# Patient Record
Sex: FEMALE | Race: WHITE | NOT HISPANIC OR LATINO | URBAN - METROPOLITAN AREA
[De-identification: names, ages, dates, MRNs, and addresses within clinical notes are randomized per-mention and may not be internally consistent; named-entity substitution may affect disease eponyms.]

---

## 2018-08-28 ENCOUNTER — IMPORTED ENCOUNTER (OUTPATIENT)
Dept: URBAN - METROPOLITAN AREA CLINIC 1 | Facility: CLINIC | Age: 58
End: 2018-08-28

## 2018-08-28 PROBLEM — H43.811: Noted: 2018-08-28

## 2018-08-28 PROCEDURE — 92002 INTRM OPH EXAM NEW PATIENT: CPT

## 2018-10-10 ENCOUNTER — IMPORTED ENCOUNTER (OUTPATIENT)
Dept: URBAN - METROPOLITAN AREA CLINIC 1 | Facility: CLINIC | Age: 58
End: 2018-10-10

## 2018-10-10 PROBLEM — H43.811: Noted: 2018-10-10

## 2018-10-10 PROBLEM — H25.813: Noted: 2018-10-10

## 2018-10-10 PROCEDURE — 92014 COMPRE OPH EXAM EST PT 1/>: CPT

## 2018-10-10 NOTE — PATIENT DISCUSSION
1.  PVD w/o Tear OD (No Pigmented Cells seen or found today) -- No Retinal Detachment and / or Tear seen or found on today's Exam. RD Precautions given. Patient was cautioned to call our office immediately if they experience a substantial change in their symptoms such as an increase in floaters persistent flashes loss of visual field (may appear as a shadow or a curtain) or decrease in visual acuity as these may indicate a retinal tear or detachment. 2.  Cataracts OU -- Observe for now without intervention. The patient was advised to contact us if any change or worsening of vision. 3.  RUL Nasal Papilloma -- Observe. Return for an appointment in 1 YR for a 30 OU with Dr. Chanda Almanza

## 2018-10-29 NOTE — PROCEDURE NOTE: SURGICAL
<p>Prior to commencing surgery patient identification, surgical procedure, site, and side were confirmed by Dr. Colt Guadalupe. Following topical proparacaine anesthesia, the patient was positioned at the YAG laser, a contact lens coupled to the cornea with methylcellulose and an axial posterior capsulotomy performed without complication using 3.2 Mj x 17. &nbsp; One drop of Alphagan was instilled and the patient returned to the holding area having tolerated the procedure well and without complication. </p><p>MRN [id number]</p>

## 2019-10-09 ENCOUNTER — IMPORTED ENCOUNTER (OUTPATIENT)
Dept: URBAN - METROPOLITAN AREA CLINIC 1 | Facility: CLINIC | Age: 59
End: 2019-10-09

## 2019-10-09 PROBLEM — D23.111: Noted: 2019-10-09

## 2019-10-09 PROBLEM — H43.811: Noted: 2019-10-09

## 2019-10-09 PROBLEM — H25.813: Noted: 2019-10-09

## 2019-10-09 PROBLEM — D23.112: Noted: 2019-10-09

## 2019-10-09 PROCEDURE — 92014 COMPRE OPH EXAM EST PT 1/>: CPT

## 2019-10-09 NOTE — PATIENT DISCUSSION
Papilloma RUL: apppears benign OD - The patient has a lesion of the right upper eyelid which appears benign. Measurements were taken and observation at regular intervals was recommended. The patient was asked to report  if there is any growth.

## 2019-10-09 NOTE — PATIENT DISCUSSION
1.  Cataract OU -- Observe for now without intervention. The patient was advised to contact us if any change or worsening of vision2. RUL Nasal Papilloma -- Observe. 3.  PVD w/o Tear OD -- RD precautions. Patient deferred Manifest Rx today. Return for an appointment in 1 year 27 with Dr. Pepito Candelaria.

## 2021-06-02 ENCOUNTER — IMPORTED ENCOUNTER (OUTPATIENT)
Dept: URBAN - METROPOLITAN AREA CLINIC 1 | Facility: CLINIC | Age: 61
End: 2021-06-02

## 2021-06-02 PROBLEM — H43.811: Noted: 2021-06-02

## 2021-06-02 PROBLEM — H25.813: Noted: 2021-06-02

## 2021-06-02 PROBLEM — D23.111: Noted: 2021-06-02

## 2021-06-02 PROCEDURE — 92014 COMPRE OPH EXAM EST PT 1/>: CPT

## 2021-06-02 NOTE — PATIENT DISCUSSION
1.  Cataract OU -- Mild progression OU. Non-surgical at this time continue to monitor for progression. No visual/glare complaints at today's visit. The patient was advised to contact us if any change or worsening of vision. 2. Papilloma RUL -- Stable. Appears benign. Observe for change. 3.  PVD w/o Tear OD -- Stable. RD Precautions. Patient deferred MRx at today's visit. Return for an appointment in 1 year 27 with Dr. Calvin Colmenares.

## 2021-06-02 NOTE — PATIENT DISCUSSION
Papilloma RUL -- Stable. Appears benign. Observe for change. 3.  PVD w/o Tear OD -- Stable. RD Precautions. Patient deferred MRx at today's visit. Return for an appointment in 1 year 27 with Dr. Hannah Singh.

## 2022-04-02 ASSESSMENT — VISUAL ACUITY
OS_SC: 20/20
OD_CC: J1+
OD_CC: J1
OS_CC: J1+
OD_CC: 20/30-2
OD_SC: 20/20
OD_SC: 20/20
OS_SC: 20/20
OD_GLARE: 20/50
OS_GLARE: 20/50
OD_SC: 20/20
OS_SC: 20/25-2
OS_CC: J1
OS_CC: 20/40

## 2022-04-02 ASSESSMENT — TONOMETRY
OS_IOP_MMHG: 15
OD_IOP_MMHG: 15
OD_IOP_MMHG: 15
OD_IOP_MMHG: 14
OS_IOP_MMHG: 14
OS_IOP_MMHG: 15
OD_IOP_MMHG: 15

## 2022-06-01 ENCOUNTER — COMPREHENSIVE EXAM (OUTPATIENT)
Dept: URBAN - METROPOLITAN AREA CLINIC 1 | Facility: CLINIC | Age: 62
End: 2022-06-01

## 2022-06-01 DIAGNOSIS — H43.811: ICD-10-CM

## 2022-06-01 DIAGNOSIS — D23.111: ICD-10-CM

## 2022-06-01 DIAGNOSIS — H25.813: ICD-10-CM

## 2022-06-01 PROCEDURE — 92014 COMPRE OPH EXAM EST PT 1/>: CPT

## 2022-06-01 PROCEDURE — 92015 DETERMINE REFRACTIVE STATE: CPT

## 2022-06-01 ASSESSMENT — VISUAL ACUITY
OS_CC: 20/20 -2
OD_CC: 20/20
OD_CC: J1
OS_CC: J1

## 2022-06-01 ASSESSMENT — TONOMETRY
OS_IOP_MMHG: 15
OD_IOP_MMHG: 15

## 2023-06-29 ENCOUNTER — COMPREHENSIVE EXAM (OUTPATIENT)
Dept: URBAN - METROPOLITAN AREA CLINIC 1 | Facility: CLINIC | Age: 63
End: 2023-06-29

## 2023-06-29 DIAGNOSIS — D23.111: ICD-10-CM

## 2023-06-29 DIAGNOSIS — H43.811: ICD-10-CM

## 2023-06-29 DIAGNOSIS — H25.813: ICD-10-CM

## 2023-06-29 PROCEDURE — 92015 DETERMINE REFRACTIVE STATE: CPT

## 2023-06-29 PROCEDURE — 92014 COMPRE OPH EXAM EST PT 1/>: CPT

## 2023-06-29 ASSESSMENT — VISUAL ACUITY
OD_CC: 20/20
OS_CC: J1
OS_CC: 20/20
OD_CC: J1

## 2023-06-29 ASSESSMENT — TONOMETRY
OS_IOP_MMHG: 14
OD_IOP_MMHG: 15

## 2024-07-01 ENCOUNTER — COMPREHENSIVE EXAM (OUTPATIENT)
Dept: URBAN - METROPOLITAN AREA CLINIC 1 | Facility: CLINIC | Age: 64
End: 2024-07-01

## 2024-07-01 DIAGNOSIS — H04.123: ICD-10-CM

## 2024-07-01 DIAGNOSIS — H16.143: ICD-10-CM

## 2024-07-01 DIAGNOSIS — D23.111: ICD-10-CM

## 2024-07-01 DIAGNOSIS — H25.813: ICD-10-CM

## 2024-07-01 DIAGNOSIS — H35.371: ICD-10-CM

## 2024-07-01 DIAGNOSIS — H43.811: ICD-10-CM

## 2024-07-01 PROCEDURE — 92014 COMPRE OPH EXAM EST PT 1/>: CPT

## 2024-07-01 ASSESSMENT — VISUAL ACUITY
OS_CC: J1+
OS_CC: 20/25-1
OD_CC: J1+
OD_BAT: 20/50
OS_BAT: 20/50
OD_CC: 20/20-1

## 2024-07-01 ASSESSMENT — TONOMETRY
OD_IOP_MMHG: 18
OS_IOP_MMHG: 18

## 2025-07-14 ENCOUNTER — COMPREHENSIVE EXAM (OUTPATIENT)
Age: 65
End: 2025-07-14

## 2025-07-14 DIAGNOSIS — H04.123: ICD-10-CM

## 2025-07-14 DIAGNOSIS — H25.813: ICD-10-CM

## 2025-07-14 DIAGNOSIS — H35.371: ICD-10-CM

## 2025-07-14 DIAGNOSIS — H16.143: ICD-10-CM

## 2025-07-14 PROCEDURE — 99214 OFFICE O/P EST MOD 30 MIN: CPT

## 2025-07-14 PROCEDURE — 92015 DETERMINE REFRACTIVE STATE: CPT
